# Patient Record
Sex: MALE | Race: WHITE | URBAN - METROPOLITAN AREA
[De-identification: names, ages, dates, MRNs, and addresses within clinical notes are randomized per-mention and may not be internally consistent; named-entity substitution may affect disease eponyms.]

---

## 2020-07-21 ENCOUNTER — HISTORICAL (OUTPATIENT)
Dept: ADMINISTRATIVE | Facility: HOSPITAL | Age: 35
End: 2020-07-21

## 2022-04-10 ENCOUNTER — HISTORICAL (OUTPATIENT)
Dept: ADMINISTRATIVE | Facility: HOSPITAL | Age: 37
End: 2022-04-10

## 2022-04-29 VITALS
DIASTOLIC BLOOD PRESSURE: 76 MMHG | WEIGHT: 185.19 LBS | BODY MASS INDEX: 26.51 KG/M2 | HEIGHT: 70 IN | SYSTOLIC BLOOD PRESSURE: 121 MMHG

## 2022-05-04 NOTE — HISTORICAL [facility]ERNER
This is a historical note converted from Mad[facility]. Formatting and pictures may have been removed.  Please reference Madeline for original formatting and attached multimedia. Chief Complaint  New patient here with right knee pain. No injury. Played football in high school had slight tear. healed that. No other issues since. Pain x2 weeks. ?Xrays today. Has popping and grinding w/ bening.  History of Present Illness  This is a 34-year-old male who comes in today with?complaints of?mild right knee pain?that is very rare and also?will complain of some grinding in his knee.? He states the grinding is not painful but it is audible.? He has no other significant issues or complaints.? No associated injury.? He is not had any catching or locking in his knee. ?He has not had any swelling at all of his knee.? He is not had any previous treatment.  Review of Systems  GENERAL: No fevers, chills, unexpected weight loss or gain  MUSCULOSKELETAL: Joint pain, extremity pain  Physical Exam  Vitals & Measurements  HR:?78(Peripheral)? BP:?121/76?  HT:?177?cm? WT:?84?kg? BMI:?26.81?  General: Well-developed, well-nourished.  Neuro: Alert and oriented x 3.  Psych: Normal mood and affect.  CV: Palpable radial pulses.  Resp: Smooth and unlabored.  Skin: No evidence of focal lesions or trauma.  Hem/Imm/Lymph: No evidence of lymphangitis or adenopathy.  Gait: No trendelenburg gait.  DTR: 2+, no hypo or hyperreflexia.  Coordination and Balance: No tremors or abnormal station.?  Knee Exam:  No obvious deformity. Range of motion from 0-130 degrees. Negative patella grind and equal subluxation of knee cap medial and lateral < 1cm. Negative patella tendon tenderness. Negative Lachman and anterior drawer test. Negative posterior drawer test. Negative varus and valgus stress test. Negative medial joint line tenderness. Negative lateral joint line tenderness. 5/5 strength and normal skin appearance. Sensibility normal.  Assessment/Plan  1.?Right knee  pain?M25.561  ?This patients x-rays and physical exam are essentially normal.? I told him since he does not have any significant pain with the?audible grinding that?I do not think is an issue.? He can continue with his ADLs and?also continue with?running his marathons.? If he starts to have more symptoms after running the marathons, then?he may need to?run less and transition over to some other form of?exercise. ?He will come back to see me as needed.  Ordered:  Office/Outpatient Visit Level 3 [id number], Right knee pain, [facility], 07/21/20 11:33:00 CDT  ?   Problem List/Past Medical History  Ongoing  No chronic problems  Historical  No qualifying data  Procedure/Surgical History  None   Medications  No active medications  Allergies  No Known Medication Allergies  Social History  Abuse/Neglect  No, 07/21/2020  Alcohol  Current, Beer, 07/21/2020  Substance Use  Never, 07/21/2020  Tobacco  Never (less than 100 in lifetime), No, 07/21/2020  Health Maintenance  Health Maintenance  ???Pending?(in the next year)  ??? ??Due?  ??? ? ? ?ADL Screening due??07/21/20??and every 1??year(s)  ??? ? ? ?Tetanus Vaccine due??07/21/20??and every 10??year(s)  ??? ??Due In Future?  ??? ? ? ?Obesity Screening not due until??01/01/21??and every 1??year(s)  ??? ? ? ?Alcohol Misuse Screening not due until??01/02/21??and every 1??year(s)  ???Satisfied?(in the past 1 year)  ??? ??Satisfied?  ??? ? ? ?Alcohol Misuse Screening on??07/21/20.??Satisfied by Michelle Greene L. L.  ??? ? ? ?Blood Pressure Screening on??07/21/20.??Satisfied by Michelle Greene L. L.  ??? ? ? ?Body Mass Index Check on??07/21/20.??Satisfied by Michelle Greene L. L.  ??? ? ? ?Obesity Screening on??07/21/20.??Satisfied by Michelle Greene  ?

## 2025-03-15 ENCOUNTER — OFFICE VISIT (OUTPATIENT)
Dept: URGENT CARE | Facility: CLINIC | Age: 40
End: 2025-03-15
Payer: COMMERCIAL

## 2025-03-15 VITALS
RESPIRATION RATE: 18 BRPM | OXYGEN SATURATION: 98 % | DIASTOLIC BLOOD PRESSURE: 78 MMHG | WEIGHT: 186 LBS | SYSTOLIC BLOOD PRESSURE: 126 MMHG | BODY MASS INDEX: 26.63 KG/M2 | TEMPERATURE: 98 F | HEART RATE: 71 BPM | HEIGHT: 70 IN

## 2025-03-15 DIAGNOSIS — R19.7 DIARRHEA, UNSPECIFIED TYPE: Primary | ICD-10-CM

## 2025-03-15 LAB
C DIFF TOX A+B STL QL IA: NEGATIVE
CLOSTRIDIUM DIFFICILE GDH ANTIGEN (OHS): NEGATIVE
FECAL LEUKOCYTE (OHS): POSITIVE

## 2025-03-15 PROCEDURE — 99202 OFFICE O/P NEW SF 15 MIN: CPT | Mod: ,,, | Performed by: NURSE PRACTITIONER

## 2025-03-15 RX ORDER — CIPROFLOXACIN 500 MG/1
500 TABLET ORAL EVERY 12 HOURS
Qty: 14 TABLET | Refills: 0 | Status: SHIPPED | OUTPATIENT
Start: 2025-03-15 | End: 2025-03-22

## 2025-03-15 NOTE — PROGRESS NOTES
"Subjective:      Patient ID: Keven Mcclellan is a 39 y.o. male.    Vitals:  height is 5' 10" (1.778 m) and weight is 84.4 kg (186 lb). His oral temperature is 98.2 °F (36.8 °C). His blood pressure is 126/78 and his pulse is 71. His respiration is 18 and oxygen saturation is 98%.     Chief Complaint: GI Problem     Patient is a 39 y.o. male who presents to urgent care with complaints of abdominal cramping, diarrhea,  x1 weeks. Alleviating factors include none. Patient denies n/v, fever.  No Nausea, vomiting        Constitution: Negative for fever.   Gastrointestinal:  Positive for diarrhea. Negative for abdominal pain, vomiting and constipation.      Objective:     Physical Exam   Constitutional: He is oriented to person, place, and time. He appears well-developed.   HENT:   Head: Normocephalic and atraumatic.   Ears:   Right Ear: External ear normal.   Left Ear: External ear normal.   Nose: Nose normal.   Mouth/Throat: Mucous membranes are normal.   Eyes: Conjunctivae and lids are normal.   Neck: Trachea normal. Neck supple.   Cardiovascular: Normal rate, regular rhythm and normal heart sounds.   Pulmonary/Chest: Effort normal and breath sounds normal. No respiratory distress.   Abdominal: Normal appearance and bowel sounds are normal. He exhibits no distension and no mass. Soft. There is no abdominal tenderness.   Musculoskeletal: Normal range of motion.         General: Normal range of motion.   Neurological: He is alert and oriented to person, place, and time. He has normal strength.   Skin: Skin is warm, dry, intact, not diaphoretic and not pale.   Psychiatric: His speech is normal and behavior is normal. Judgment and thought content normal.   Nursing note and vitals reviewed.    Previous History:     Review of patient's allergies indicates:  No Known Allergies    Past Medical History:   Diagnosis Date    No known health problems      Current Outpatient Medications   Medication Instructions    ciprofloxacin HCl " (CIPRO) 500 mg, Oral, Every 12 hours     Past Surgical History:   Procedure Laterality Date    NO PAST SURGERIES       Family History   Problem Relation Name Age of Onset    Hypertension Mother      No Known Problems Father      No Known Problems Brother         Social History[1]  Assessment:     1. Diarrhea, unspecified type        Plan:   Immodium OTC for diarrhea, if diarrhea continues this evening or tomorrow morning bring back stool sample as instructed  Rest and stay hydrated.  Take tylenol/motrin as needed for pain/fever.  Eat a bland diet for the next few days while your stomach recovers.  Please follow up with your primary care provider within 2-5 days if your signs and symptoms have not resolved or worsen.   If your condition worsens or fails to improve we recommend that you receive another evaluation at the emergency room immediately or contact your primary medical clinic to discuss your concerns.    Patient returns stool sample today,  ciprofloxacin antibiotics E prescribed  Will notify you of the stool sample culture results, will wait for results and add Flagyl if indicated  Diarrhea, unspecified type  -     GIARDIA/CRYPTOSPORIDIUM ANTIGEN; Future; Expected date: 03/15/2025  -     FECAL LEUKOCYTES - LACTOFERRIN ON  STOOL; Future; Expected date: 03/15/2025  -     Clostridium Diff Toxin, A & B, EIA  -     Stool Exam-Ova,Cysts,Parasites; Future; Expected date: 03/15/2025  -     Stool Culture  -     ciprofloxacin HCl (CIPRO) 500 MG tablet; Take 1 tablet (500 mg total) by mouth every 12 (twelve) hours. for 7 days  Dispense: 14 tablet; Refill: 0                         [1]   Social History  Tobacco Use    Smoking status: Never    Smokeless tobacco: Never   Substance Use Topics    Alcohol use: Yes     Alcohol/week: 6.0 standard drinks of alcohol     Types: 6 Cans of beer per week    Drug use: Never

## 2025-03-15 NOTE — PATIENT INSTRUCTIONS
Immodium OTC for diarrhea, if diarrhea continues this evening or tomorrow morning bring back stool sample as instructed  Rest and stay hydrated.  Take tylenol/motrin as needed for pain/fever.  Eat a bland diet for the next few days while your stomach recovers.  Please follow up with your primary care provider within 2-5 days if your signs and symptoms have not resolved or worsen.   If your condition worsens or fails to improve we recommend that you receive another evaluation at the emergency room immediately or contact your primary medical clinic to discuss your concerns.

## 2025-03-16 ENCOUNTER — RESULTS FOLLOW-UP (OUTPATIENT)
Dept: URGENT CARE | Facility: CLINIC | Age: 40
End: 2025-03-16

## 2025-03-16 LAB
CRYPTOSP AG SPEC QL: NEGATIVE
G LAMBLIA AG STL QL IA: NEGATIVE

## 2025-03-16 RX ORDER — METRONIDAZOLE 500 MG/1
500 TABLET ORAL EVERY 12 HOURS
Qty: 14 TABLET | Refills: 0 | Status: SHIPPED | OUTPATIENT
Start: 2025-03-16 | End: 2025-03-23

## 2025-03-17 LAB — BACTERIA STL CULT: NORMAL

## 2025-03-20 LAB — O+P STL MICRO: NORMAL
